# Patient Record
Sex: FEMALE | Race: WHITE | NOT HISPANIC OR LATINO | Employment: STUDENT | URBAN - METROPOLITAN AREA
[De-identification: names, ages, dates, MRNs, and addresses within clinical notes are randomized per-mention and may not be internally consistent; named-entity substitution may affect disease eponyms.]

---
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
## 2019-01-09 ENCOUNTER — DOCUMENTATION ONLY (OUTPATIENT)
Dept: PEDIATRIC CARDIOLOGY | Facility: CLINIC | Age: 19
End: 2019-01-09

## 2019-01-09 NOTE — PROGRESS NOTES
Dr. Thomason called and spoke to Dr. Arreola on 1/9/2019. He saw that she was diagnosed with tachycardia and wanted to know if she could continue Vyvanse 30 mg that she has been on for several years. Dr. Arreola reviewed chart. Ok to continue Vyvanse. If her heart rate if consistently 120 bpm or greater, then may have to stop Vyvanse per Dr. Arreola. If there is any concern about her HR then can do a holter monitor. Per Dr. Calixto, she has not started Florinef since it was back ordered.       Message   Received: Today   Message Contents   JUAN PABLO Gregorio, NP             Dr. Thomason called and spoke to Dr. Arreola on 1/9/2019. He saw that she was diagnosed with tachycardia and wanted to know if she could continue Vyvanse 30 mg that she has been on for several years. Dr. Arreola reviewed chart. Ok to continue Vyvanse. If her heart rate if consistently 120 bpm or greater, then may have to stop Vyvanse. If there is any concern about her HR then can do a holter monitor. Per Dr. Calixto, she has not started Florinef since it was back ordered. You may want to send prescription  to a different pharmacy if she is still unable to get medication.

## 2019-01-14 ENCOUNTER — TELEPHONE (OUTPATIENT)
Dept: PEDIATRIC CARDIOLOGY | Facility: CLINIC | Age: 19
End: 2019-01-14

## 2019-01-14 NOTE — TELEPHONE ENCOUNTER
Spoke with patient by phone.  Still has not been able to get Florinef filled.  Encouraged her to call other local pharmacies to see if Florinef is in stock.

## 2019-01-14 NOTE — TELEPHONE ENCOUNTER
----- Message from Mi Harding PA-C sent at 1/9/2019  4:05 PM CST -----  Dr. Thomason called and spoke to Dr. Arreola on 1/9/2019. He saw that she was diagnosed with tachycardia and wanted to know if she could continue Vyvanse 30 mg that she has been on for several years. Dr. Arreola reviewed chart. Ok to continue Vyvanse. If her heart rate if consistently 120 bpm or greater, then may have to stop Vyvanse. If there is any concern about her HR then can do a holter monitor. Per Dr. Calixto, she has not started Florinef since it was back ordered. You may want to send prescription  to a different pharmacy if she is still unable to get medication.